# Patient Record
Sex: MALE | Race: BLACK OR AFRICAN AMERICAN | Employment: OTHER | URBAN - METROPOLITAN AREA
[De-identification: names, ages, dates, MRNs, and addresses within clinical notes are randomized per-mention and may not be internally consistent; named-entity substitution may affect disease eponyms.]

---

## 2019-01-29 ENCOUNTER — OFFICE VISIT (OUTPATIENT)
Dept: UROLOGY | Facility: CLINIC | Age: 64
End: 2019-01-29
Payer: COMMERCIAL

## 2019-01-29 VITALS — HEART RATE: 76 BPM | HEIGHT: 68 IN | OXYGEN SATURATION: 95 % | WEIGHT: 170 LBS | BODY MASS INDEX: 25.76 KG/M2

## 2019-01-29 DIAGNOSIS — R31.0 GROSS HEMATURIA: ICD-10-CM

## 2019-01-29 DIAGNOSIS — R31.29 MICROHEMATURIA: Primary | ICD-10-CM

## 2019-01-29 LAB
ALBUMIN UR-MCNC: NEGATIVE MG/DL
APPEARANCE UR: CLEAR
BILIRUB UR QL STRIP: NEGATIVE
COLOR UR AUTO: YELLOW
GLUCOSE UR STRIP-MCNC: NEGATIVE MG/DL
HGB UR QL STRIP: ABNORMAL
KETONES UR STRIP-MCNC: NEGATIVE MG/DL
LEUKOCYTE ESTERASE UR QL STRIP: NEGATIVE
NITRATE UR QL: NEGATIVE
PH UR STRIP: 5 PH (ref 5–7)
SOURCE: ABNORMAL
SP GR UR STRIP: <=1.005 (ref 1–1.03)
UROBILINOGEN UR STRIP-ACNC: 0.2 EU/DL (ref 0.2–1)

## 2019-01-29 PROCEDURE — 99202 OFFICE O/P NEW SF 15 MIN: CPT | Mod: 25 | Performed by: UROLOGY

## 2019-01-29 PROCEDURE — 88120 CYTP URNE 3-5 PROBES EA SPEC: CPT | Performed by: UROLOGY

## 2019-01-29 PROCEDURE — 81003 URINALYSIS AUTO W/O SCOPE: CPT | Performed by: UROLOGY

## 2019-01-29 PROCEDURE — 52000 CYSTOURETHROSCOPY: CPT | Performed by: UROLOGY

## 2019-01-29 RX ORDER — LEVOTHYROXINE SODIUM 100 UG/1
100 TABLET ORAL DAILY
COMMUNITY
Start: 2016-11-02 | End: 2020-11-04 | Stop reason: DRUGHIGH

## 2019-01-29 RX ORDER — CIPROFLOXACIN 500 MG/1
500 TABLET, FILM COATED ORAL ONCE
Qty: 1 TABLET | Refills: 0 | Status: SHIPPED | OUTPATIENT
Start: 2019-01-29 | End: 2019-05-01

## 2019-01-29 RX ORDER — DIPHENOXYLATE HYDROCHLORIDE AND ATROPINE SULFATE 2.5; .025 MG/1; MG/1
1 TABLET ORAL DAILY
COMMUNITY

## 2019-01-29 RX ORDER — LIDOCAINE HYDROCHLORIDE 20 MG/ML
5 INJECTION, SOLUTION INFILTRATION; PERINEURAL ONCE
Status: SHIPPED | OUTPATIENT
Start: 2019-01-29

## 2019-01-29 ASSESSMENT — MIFFLIN-ST. JEOR: SCORE: 1530.61

## 2019-01-29 ASSESSMENT — PAIN SCALES - GENERAL: PAINLEVEL: NO PAIN (0)

## 2019-01-29 NOTE — NURSING NOTE
Prior to the start of the procedure and with procedural staff participation, I verbally confirmed the patient s identity using two indicators, relevant allergies, that the procedure was appropriate and matched the consent or emergent situation, and that the correct equipment/implants were available. Immediately prior to starting the procedure I conducted the Time Out with the procedural staff and re-confirmed the patient s name, procedure, and site/side. (The Joint Commission universal protocol was followed.)  Yes    Sedation (Moderate or Deep): None  Pt has signed the consent form stating that we will be doing a CYSTOSCOPY (with or without stent removal) today, and that it is the correct procedure. I verbally confirmed the patient s identity using two indicators, relevant allergies, and that the correct equipment was available. Post-op information given to the pt as needed at check-out. I have sent an appropriate antibiotic to the pharmacy in our building as recommended by the MD. REGIS Sood CMA

## 2019-01-29 NOTE — LETTER
1/29/2019       RE: Ulysses Orlando  [address]  [place] [address]     Dear Colleague,    Thank you for referring your patient, Ulysses Orlando, to the [facility] [place] at [facility]. Please see a copy of my visit note below.    Ulysses is a pleasant 63-year-old male who had one episode of terminal gross hematuria about 3 weeks ago.  His urinalysis today shows trace of hemoglobin only and will be sent for urine fish test.  He has had no trouble voiding in the past, gross hematuria, stones, UTIs, prostate cancer.  There is no family history of prostate cancer.  He has had normal PSAs in the past but only had 2 rectal exams in the last 6 years.  Other past medical history: Non-smoker, hypothyroidism  Medications: Occasional aspirin, vitamin D, Synthroid, multivitamin  Allergies: None  Exam: Alert and oriented, normal, normal respiration intact.  Normal vital signs.  Normal sphincter tone, no rectal mass or impaction, small benign prostate, normal seminal vesicles.  Normal external genitalia and urethral meatus  Flexible cystoscopy-normal urethra, very mild inflammatory changes in prostatic fossa with no papillary tumors or stones.  Normal bladder neck, normal bladder mucosa  Assessment: Terminal gross hematuria-bleeding usually comes from the prostate in this situation.  He has a normal prostate exam a normal cystoscopic exam.  Will call with fish urine results.  Plan: Antibiotic x1 today.  If he has more terminal gross hematuria in the future he will need a repeat fish test and to start finasteride 5 mg daily.  The patient should have a yearly digital rectal exam and PSAs every other year until age 68    Again, thank you for allowing me to participate in the care of your patient.      Sincerely,    Paresh Salcido MD

## 2019-01-29 NOTE — PROGRESS NOTES
Ulysses is a pleasant 63-year-old male who had one episode of terminal gross hematuria about 3 weeks ago.  His urinalysis today shows trace of hemoglobin only and will be sent for urine fish test.  He has had no trouble voiding in the past, gross hematuria, stones, UTIs, prostate cancer.  There is no family history of prostate cancer.  He has had normal PSAs in the past but only had 2 rectal exams in the last 6 years.  Other past medical history: Non-smoker, hypothyroidism  Medications: Occasional aspirin, vitamin D, Synthroid, multivitamin  Allergies: None  Exam: Alert and oriented, normal, normal respiration intact.  Normal vital signs.  Normal sphincter tone, no rectal mass or impaction, small benign prostate, normal seminal vesicles.  Normal external genitalia and urethral meatus  Flexible cystoscopy-normal urethra, very mild inflammatory changes in prostatic fossa with no papillary tumors or stones.  Normal bladder neck, normal bladder mucosa  Assessment: Terminal gross hematuria-bleeding usually comes from the prostate in this situation.  He has a normal prostate exam a normal cystoscopic exam.  Will call with fish urine results.  Plan: Antibiotic x1 today.  If he has more terminal gross hematuria in the future he will need a repeat fish test and to start finasteride 5 mg daily.  The patient should have a yearly digital rectal exam and PSAs every other year until age 68

## 2019-01-29 NOTE — NURSING NOTE
PSA in epic from 11-28-18.  Pt had drops of blood in his urine at the end of flow.  Notes in care everywhere.  Just that one time is all he saw.  Pt had dysuria when he saw the blood.  PCP= [facility] [place].  REGIS Sood CMA

## 2019-01-29 NOTE — LETTER
1/29/2019      RE: Ulysses Malhotra [facility]  [address]  [place] [address]       Ulysses is a pleasant 63-year-old male who had one episode of terminal gross hematuria about 3 weeks ago.  His urinalysis today shows trace of hemoglobin only and will be sent for urine fish test.  He has had no trouble voiding in the past, gross hematuria, stones, UTIs, prostate cancer.  There is no family history of prostate cancer.  He has had normal PSAs in the past but only had 2 rectal exams in the last 6 years.  Other past medical history: Non-smoker, hypothyroidism  Medications: Occasional aspirin, vitamin D, Synthroid, multivitamin  Allergies: None  Exam: Alert and oriented, normal, normal respiration intact.  Normal vital signs.  Normal sphincter tone, no rectal mass or impaction, small benign prostate, normal seminal vesicles.  Normal external genitalia and urethral meatus  Flexible cystoscopy-normal urethra, very mild inflammatory changes in prostatic fossa with no papillary tumors or stones.  Normal bladder neck, normal bladder mucosa  Assessment: Terminal gross hematuria-bleeding usually comes from the prostate in this situation.  He has a normal prostate exam a normal cystoscopic exam.  Will call with fish urine results.  Plan: Antibiotic x1 today.  If he has more terminal gross hematuria in the future he will need a repeat fish test and to start finasteride 5 mg daily.  The patient should have a yearly digital rectal exam and PSAs every other year until age 68    Paresh Salcido MD

## 2019-01-29 NOTE — PATIENT INSTRUCTIONS
"     AFTER YOUR CYSTOSCOPY         You have just completed a cystoscopy, or \"cysto\", which allowed your physician to learn more about your bladder (or to remove a stent placed after surgery). We suggest that you continue to avoid caffeine, fruit juice, and alcohol for the next 24 hours, however, you are encouraged to return to your normal activities.       A few things that are considered normal after your cystoscopy:    * small amount of bleeding (or spotting) that clears within the next 24 hours    * slight burning sensation with urination    * sensation to of needing to avoid more frequently    * the feeling of \"air\" in your urine    * mild discomfort that is relieved with Tylonol        Please contact our office promptly if you:    * develop a fever above 101 degrees    * are unable to urinate    * develop bright red blood that does not stop    * severe pain or swelling        And of course, please contact our office with any concerns or questions [phone]        "

## 2019-01-29 NOTE — LETTER
Date:January 30, 2019      Patient was self referred, no letter generated. Do not send.        [facility]

## 2019-02-08 LAB — COPATH REPORT: NORMAL

## 2019-05-01 ENCOUNTER — OFFICE VISIT (OUTPATIENT)
Dept: PEDIATRICS | Facility: CLINIC | Age: 64
End: 2019-05-01
Payer: COMMERCIAL

## 2019-05-01 VITALS
WEIGHT: 175.7 LBS | DIASTOLIC BLOOD PRESSURE: 66 MMHG | HEART RATE: 84 BPM | SYSTOLIC BLOOD PRESSURE: 132 MMHG | TEMPERATURE: 98.1 F | BODY MASS INDEX: 26.02 KG/M2 | HEIGHT: 69 IN | OXYGEN SATURATION: 97 %

## 2019-05-01 DIAGNOSIS — R03.0 ELEVATED BLOOD PRESSURE READING WITHOUT DIAGNOSIS OF HYPERTENSION: Primary | ICD-10-CM

## 2019-05-01 PROCEDURE — 99202 OFFICE O/P NEW SF 15 MIN: CPT | Performed by: INTERNAL MEDICINE

## 2019-05-01 ASSESSMENT — MIFFLIN-ST. JEOR: SCORE: 1567.35

## 2019-05-01 NOTE — PATIENT INSTRUCTIONS
For now you do not need medication for your blood pressure    Check periodically   Optimal BP readings: <130 / <85   Too high: >140 / >90   Very high: >180 / >105

## 2019-05-01 NOTE — PROGRESS NOTES
"  SUBJECTIVE:   Said ZAFAR Orlando is a 64 year old male who presents to clinic today for the following   health issues:    Patient here to follow up on Urology appointment and blood pressure      Hx of borderline blood pressure readings.  BP Readings from Last 3 Encounters:   05/01/19 132/66     No cardiac sx such as CP, palpitations, PND, orthopnea, GREWAL or peripheral edema.     He checks his BP periodically.  Notes SBP typically in the 130's.  DBP in the 80's to low 90's.     No hx of medications for BP.     Reviewed and updated as needed this visit by Provider  Tobacco  Allergies  Meds  Problems  Med Hx  Surg Hx  Fam Hx         Labs reviewed in EPIC     ROS: 7 point ROS neg other than the symptoms noted above in the HPI.      OBJECTIVE:     /66 (BP Location: Right arm, Patient Position: Chair, Cuff Size: Adult Regular)   Pulse 84   Temp 98.1  F (36.7  C) (Oral)   Ht 1.753 m (5' 9\")   Wt 79.7 kg (175 lb 11.2 oz)   SpO2 97%   BMI 25.95 kg/m    Body mass index is 25.95 kg/m .     GEN: no distress  HEENT: PERRL. No nasal discharge. OP moist.  SKIN: no rashes  NECK: supple  LUNGS: Clear to auscultation bilaterally. No rhonchi, rales, wheezes or retractions.  CV: Regular rate and rhythm.  No murmurs, rubs or gallops. Pulses 2+ radial.   EXTR: No edema  PSYCH: Normal affect. Well groomed. Good eye contact.     ASSESSMENT/PLAN:       ICD-10-CM    1. Elevated blood pressure reading without diagnosis of hypertension R03.0      No clear dx of HTN.    Patient Instructions   For now you do not need medication for your blood pressure    Check periodically   Optimal BP readings: <130 / <85   Too high: >140 / >90   Very high: >180 / >105    Tomy Cartwright MD  [facility]        "

## 2019-07-01 ENCOUNTER — TELEPHONE (OUTPATIENT)
Dept: UROLOGY | Facility: CLINIC | Age: 64
End: 2019-07-01

## 2019-07-01 NOTE — TELEPHONE ENCOUNTER
Note sent to K to see if he wants to see pt or just start finasteride and do a fish with out seeing pt.  Pt wants to just start finasteride.  REGIS Sood CMA

## 2019-07-01 NOTE — TELEPHONE ENCOUNTER
[facility] Call Center    Phone Message    May a detailed message be left on voicemail: yes    Reason for Call: Symptoms or Concerns     If patient has red-flag symptoms, warm transfer to triage line    Current symptom or concern: blood in urine again.    Symptoms have been present for:  1 day(s)    Has patient previously been seen for this? Yes    By Paresh Salcido MD    Date: 01/29/19    Are there any new or worsening symptoms? Yes: Blood in urine again      Pt would like a call back to let him know if he should be seen by the doctor again or what treatment he might try.  Please call ASAP    Action Taken: Message routed to:  [facility] ([facility] [place]

## 2019-07-02 ENCOUNTER — TELEPHONE (OUTPATIENT)
Dept: UROLOGY | Facility: CLINIC | Age: 64
End: 2019-07-02

## 2019-07-02 NOTE — TELEPHONE ENCOUNTER
I called pt to inform him to start finasteride.  No answer... So I LM for him to call us back.  When he calls back let him know he can start finasteride and which pharmacy he wants the RX to go to.  And... Lets get a fish test.    REGIS Sood CMA

## 2019-07-02 NOTE — TELEPHONE ENCOUNTER
----- Message from Paresh Salcido MD sent at 7/1/2019 10:16 PM CDT -----  OK to start finasteride  ----- Message -----  From: Isabelle Mueller CMA  Sent: 7/1/2019   2:17 PM  To: Paresh Salcido MD    Pt wants to start finasteride.  Pt and you spoke at his last visit.  Can I just sent finasteride in for him and have him do the fish test?  REGIS Sood CMA

## 2019-07-09 DIAGNOSIS — R31.9 HEMATURIA: ICD-10-CM

## 2019-07-09 DIAGNOSIS — N41.9 INFLAMMATORY DISEASE OF PROSTATE: Primary | ICD-10-CM

## 2019-07-09 RX ORDER — FINASTERIDE 5 MG/1
5 TABLET, FILM COATED ORAL DAILY
Qty: 90 TABLET | Refills: 1 | Status: SHIPPED | OUTPATIENT
Start: 2019-07-09 | End: 2020-11-04

## 2019-07-10 DIAGNOSIS — R31.29 MICROSCOPIC HEMATURIA: Primary | ICD-10-CM

## 2019-07-10 DIAGNOSIS — R31.29 MICROSCOPIC HEMATURIA: ICD-10-CM

## 2019-07-10 PROCEDURE — 88120 CYTP URNE 3-5 PROBES EA SPEC: CPT | Performed by: UROLOGY

## 2019-07-19 LAB — COPATH REPORT: NORMAL

## 2019-10-02 ENCOUNTER — HEALTH MAINTENANCE LETTER (OUTPATIENT)
Age: 64
End: 2019-10-02

## 2019-11-06 ENCOUNTER — OFFICE VISIT (OUTPATIENT)
Dept: UROLOGY | Facility: CLINIC | Age: 64
End: 2019-11-06
Payer: COMMERCIAL

## 2019-11-06 VITALS
WEIGHT: 175 LBS | SYSTOLIC BLOOD PRESSURE: 140 MMHG | HEIGHT: 69 IN | DIASTOLIC BLOOD PRESSURE: 78 MMHG | HEART RATE: 80 BPM | BODY MASS INDEX: 25.92 KG/M2

## 2019-11-06 DIAGNOSIS — N02.9 BENIGN HEMATURIA: Primary | ICD-10-CM

## 2019-11-06 PROCEDURE — 99212 OFFICE O/P EST SF 10 MIN: CPT | Performed by: UROLOGY

## 2019-11-06 RX ORDER — FINASTERIDE 5 MG/1
5 TABLET, FILM COATED ORAL DAILY
Qty: 90 TABLET | Refills: 3 | Status: SHIPPED | OUTPATIENT
Start: 2019-11-06 | End: 2020-11-04

## 2019-11-06 SDOH — HEALTH STABILITY: MENTAL HEALTH: HOW OFTEN DO YOU HAVE A DRINK CONTAINING ALCOHOL?: NEVER

## 2019-11-06 ASSESSMENT — MIFFLIN-ST. JEOR: SCORE: 1564.17

## 2019-11-06 ASSESSMENT — PAIN SCALES - GENERAL: PAINLEVEL: NO PAIN (0)

## 2019-11-06 NOTE — PROGRESS NOTES
Ulysses is a 64-year-old male who I saw early in the year because of initial hematuria.  He is found to have a normal cystoscopic exam, normal digital rectal exam and normal urine FISH tests.  He started finasteride later in the year.  He now presents with a concern at the end of the penis-he feels a mass in the glans penis on the right side.  He is having no trouble voiding and no hematuria  Other past medical history: Non-smoker  Medications: Low-dose aspirin, vitamin D, finasteride, Synthroid, multivitamin  Allergies: None  Review of systems: 10 point review of systems negative  Exam: Alert and oriented, normal appearance, normal vital signs.  Normal respirations, neuro grossly intact.  External genitalia normal including glans penis.  There is no mass in the glands on either side.  Normal urethral meatus.  Digital rectal exam not repeated  Assessment: Reassurance.  Refill finasteride and see as needed  He will see Dr. Cartwright at [facility] for yearly physicals and that should include a yearly digital rectal exam and PSA every other year until age 68, then yearly digital rectal exams only unless palpable abnormality

## 2019-11-06 NOTE — LETTER
11/6/2019       RE: Ulysses Orlando  [address]  [place] [address]     Dear Colleague,    Thank you for referring your patient, Ulysses Orlando, to the [facility] UROLOGY CLIN[facility] [place] at [facility]. Please see a copy of my visit note below.    Ulysses is a 64-year-old male who I saw early in the year because of initial hematuria.  He is found to have a normal cystoscopic exam, normal digital rectal exam and normal urine FISH tests.  He started finasteride later in the year.  He now presents with a concern at the end of the penis-he feels a mass in the glans penis on the right side.  He is having no trouble voiding and no hematuria  Other past medical history: Non-smoker  Medications: Low-dose aspirin, vitamin D, finasteride, Synthroid, multivitamin  Allergies: None  Review of systems: 10 point review of systems negative  Exam: Alert and oriented, normal appearance, normal vital signs.  Normal respirations, neuro grossly intact.  External genitalia normal including glans penis.  There is no mass in the glands on either side.  Normal urethral meatus.  Digital rectal exam not repeated  Assessment: Reassurance.  Refill finasteride and see as needed  He will see Dr. Cartwright at [facility] for yearly physicals and that should include a yearly digital rectal exam and PSA every other year until age 70, then yearly digital rectal exams only unless palpable abnormality    Again, thank you for allowing me to participate in the care of your patient.      Sincerely,    Paresh Salcido MD

## 2019-12-15 ENCOUNTER — HEALTH MAINTENANCE LETTER (OUTPATIENT)
Age: 64
End: 2019-12-15

## 2020-07-23 ENCOUNTER — TRANSFERRED RECORDS (OUTPATIENT)
Dept: HEALTH INFORMATION MANAGEMENT | Facility: CLINIC | Age: 65
End: 2020-07-23

## 2020-11-03 ASSESSMENT — ENCOUNTER SYMPTOMS
HEMATURIA: 0
COUGH: 0
PARESTHESIAS: 0
CONSTIPATION: 0
ARTHRALGIAS: 0
DIZZINESS: 0
FREQUENCY: 0
SORE THROAT: 0
FEVER: 0
HEADACHES: 0
MYALGIAS: 0
HEARTBURN: 0
NERVOUS/ANXIOUS: 0
ABDOMINAL PAIN: 0
PALPITATIONS: 0
WEAKNESS: 0
JOINT SWELLING: 0
DIARRHEA: 0
NAUSEA: 0
EYE PAIN: 0
DYSURIA: 0
HEMATOCHEZIA: 0
CHILLS: 0
SHORTNESS OF BREATH: 0

## 2020-11-03 ASSESSMENT — ACTIVITIES OF DAILY LIVING (ADL): CURRENT_FUNCTION: NO ASSISTANCE NEEDED

## 2020-11-04 ENCOUNTER — OFFICE VISIT (OUTPATIENT)
Dept: PEDIATRICS | Facility: CLINIC | Age: 65
End: 2020-11-04
Payer: COMMERCIAL

## 2020-11-04 VITALS
WEIGHT: 181 LBS | TEMPERATURE: 98 F | SYSTOLIC BLOOD PRESSURE: 130 MMHG | OXYGEN SATURATION: 97 % | HEART RATE: 87 BPM | DIASTOLIC BLOOD PRESSURE: 72 MMHG | BODY MASS INDEX: 26.73 KG/M2 | RESPIRATION RATE: 16 BRPM

## 2020-11-04 DIAGNOSIS — E03.9 ACQUIRED HYPOTHYROIDISM: ICD-10-CM

## 2020-11-04 DIAGNOSIS — Z00.00 ENCOUNTER FOR MEDICARE ANNUAL WELLNESS EXAM: Primary | ICD-10-CM

## 2020-11-04 DIAGNOSIS — N02.9 BENIGN HEMATURIA: ICD-10-CM

## 2020-11-04 DIAGNOSIS — Z86.0100 HISTORY OF COLONIC POLYPS: ICD-10-CM

## 2020-11-04 DIAGNOSIS — Z12.5 SCREENING FOR PROSTATE CANCER: ICD-10-CM

## 2020-11-04 PROCEDURE — 80048 BASIC METABOLIC PNL TOTAL CA: CPT | Performed by: INTERNAL MEDICINE

## 2020-11-04 PROCEDURE — 36415 COLL VENOUS BLD VENIPUNCTURE: CPT | Performed by: INTERNAL MEDICINE

## 2020-11-04 PROCEDURE — G0103 PSA SCREENING: HCPCS | Performed by: INTERNAL MEDICINE

## 2020-11-04 PROCEDURE — 99397 PER PM REEVAL EST PAT 65+ YR: CPT | Performed by: INTERNAL MEDICINE

## 2020-11-04 PROCEDURE — 80061 LIPID PANEL: CPT | Performed by: INTERNAL MEDICINE

## 2020-11-04 RX ORDER — FINASTERIDE 5 MG/1
5 TABLET, FILM COATED ORAL DAILY
Qty: 90 TABLET | Refills: 3 | Status: SHIPPED | OUTPATIENT
Start: 2020-11-04

## 2020-11-04 RX ORDER — LEVOTHYROXINE SODIUM 112 UG/1
112 TABLET ORAL DAILY
COMMUNITY
Start: 2020-11-04

## 2020-11-04 ASSESSMENT — ACTIVITIES OF DAILY LIVING (ADL): CURRENT_FUNCTION: NO ASSISTANCE NEEDED

## 2020-11-04 ASSESSMENT — ENCOUNTER SYMPTOMS
DYSURIA: 0
JOINT SWELLING: 0
HEMATOCHEZIA: 0
CONSTIPATION: 0
MYALGIAS: 0
FEVER: 0
NERVOUS/ANXIOUS: 0
PARESTHESIAS: 0
HEMATURIA: 0
FREQUENCY: 0
DIZZINESS: 0
EYE PAIN: 0
COUGH: 0
PALPITATIONS: 0
SHORTNESS OF BREATH: 0
ARTHRALGIAS: 0
NAUSEA: 0
HEADACHES: 0
WEAKNESS: 0
HEARTBURN: 0
SORE THROAT: 0
DIARRHEA: 0
ABDOMINAL PAIN: 0
CHILLS: 0

## 2020-11-04 NOTE — PATIENT INSTRUCTIONS
Patient Education   Personalized Prevention Plan  You are due for the preventive services outlined below.  Your care team is available to assist you in scheduling these services.  If you have already completed any of these items, please share that information with your care team to update in your medical record.  Health Maintenance Due   Topic Date Due     ANNUAL REVIEW OF HM ORDERS  1955     Discuss Advance Care Planning  1955     Colorectal Cancer Screening  01/31/1963     Hepatitis C Screening  01/31/1971     Diptheria Tetanus Pertussis (DTAP/TDAP/TD) Vaccine (1 - Tdap) 01/31/1978     Cholesterol Lab  01/31/1988     Zoster (Shingles) Vaccine (1 of 2) 01/31/2003     Annual Wellness Visit  01/31/2018     FALL RISK ASSESSMENT  01/31/2018     Pneumococcal Vaccine (1 of 1 - PPSV23) 01/31/2018     AORTIC ANEURYSM SCREENING (SYSTEM ASSIGNED)  01/31/2018

## 2020-11-04 NOTE — PROGRESS NOTES
"Answers for HPI/ROS submitted by the patient on 11/3/2020   Annual Exam:  In general, how would you rate your overall physical health?: good  Frequency of exercise:: None  Do you usually eat at least 4 servings of fruit and vegetables a day, include whole grains & fiber, and avoid regularly eating high fat or \"junk\" foods? : Yes  Medication side effects:: None  Activities of Daily Living: no assistance needed  Home safety: no safety concerns identified  Hearing Impairment:: difficulty understanding speech on the telephone  In the past 6 months, have you been bothered by leaking of urine?: No  abdominal pain: No  Blood in stool: No  Blood in urine: No  chest pain: No  chills: No  congestion: No  constipation: No  cough: No  diarrhea: No  dizziness: No  ear pain: No  eye pain: No  nervous/anxious: No  fever: No  frequency: No  genital sores: No  headaches: No  hearing loss: No  heartburn: No  arthralgias: No  joint swelling: No  peripheral edema: No  mood changes: No  myalgias: No  SUBJECTIVE:   Said ZAFAR Orlando is a 65 year old male who presents for Preventive Visit.    Are you in the first 12 months of your Medicare coverage?  No    Healthy Habits:     In general, how would you rate your overall health?  Good    Frequency of exercise:  None    Do you usually eat at least 4 servings of fruit and vegetables a day, include whole grains    & fiber and avoid regularly eating high fat or \"junk\" foods?  Yes    Medication side effects:  None    Ability to successfully perform activities of daily living:  No assistance needed    Home Safety:  No safety concerns identified    Hearing Impairment:  Difficulty understanding speech on the telephone    In the past 6 months, have you been bothered by leaking of urine?  No    In general, how would you rate your overall mental or emotional health?  Excellent      PHQ-2 Total Score: 0    Do you feel safe in your environment? Yes    Have you ever done Advance Care Planning? (For " example, a Health Directive, POLST, or a discussion with a medical provider or your loved ones about your wishes): No, advance care planning information given to patient to review.  Patient plans to discuss their wishes with loved ones or provider.      Fall risk  Fallen 2 or more times in the past year?: No  Any fall with injury in the past year?: No    Cognitive Screening   1) Repeat 3 items (Leader, Season, Table)    2) Clock draw: NORMAL  3) 3 item recall: Recalls 2 objects   Results: NORMAL clock, 1-2 items recalled: COGNITIVE IMPAIRMENT LESS LIKELY    Mini-CogTM Copyright S Meghann. Licensed by the author for use in [facility]; reprinted with permission (blanquita@[facility]). All rights reserved.      BPH. Helped by finasteride.    Hypothyroid. Managed by endocrinology. Current dose 112 mcg.    Do you have sleep apnea, excessive snoring or daytime drowsiness?: no    Reviewed and updated as needed this visit by Provider  Tobacco  Allergies  Meds  Problems  Med Hx  Surg Hx  Fam Hx           Social History     Tobacco Use     Smoking status: Never Smoker     Smokeless tobacco: Never Used   Substance Use Topics     Alcohol use: Never     Frequency: Never     If you drink alcohol do you typically have >3 drinks per day or >7 drinks per week? No    Alcohol Use 11/3/2020   Prescreen: >3 drinks/day or >7 drinks/week? No         Current providers sharing in care for this patient include:   Patient Care Team:  Tomy Cartwright MD as PCP - General (Internal Medicine)  Tomy Cartwright MD as Assigned PCP  Paresh Salcido MD as Assigned Surgical Provider    The following health maintenance items are reviewed in Epic and correct as of today:  Health Maintenance   Topic Date Due     ANNUAL REVIEW OF HM ORDERS  1955     COLORECTAL CANCER SCREENING  01/31/1963     HEPATITIS C SCREENING  01/31/1971     DTAP/TDAP/TD IMMUNIZATION (1 - Tdap) 01/31/1978     LIPID  01/31/1988     ZOSTER IMMUNIZATION (1 of 2)  "01/31/2003     FALL RISK ASSESSMENT  01/31/2018     Pneumococcal Vaccine: 65+ Years (1 of 1 - PPSV23) 01/31/2018     AORTIC ANEURYSM SCREENING (SYSTEM ASSIGNED)  01/31/2018     MEDICARE ANNUAL WELLNESS VISIT  11/04/2021     ADVANCE CARE PLANNING  11/04/2025     PHQ-2  Completed     INFLUENZA VACCINE  Completed     Pneumococcal Vaccine: Pediatrics (0 to 5 Years) and At-Risk Patients (6 to 64 Years)  Aged Out     IPV IMMUNIZATION  Aged Out     MENINGITIS IMMUNIZATION  Aged Out     HEPATITIS B IMMUNIZATION  Aged Out       Review of Systems   Constitutional: Negative for chills and fever.   HENT: Negative for congestion, ear pain, hearing loss and sore throat.    Eyes: Negative for pain and visual disturbance.   Respiratory: Negative for cough and shortness of breath.    Cardiovascular: Negative for chest pain, palpitations and peripheral edema.   Gastrointestinal: Negative for abdominal pain, constipation, diarrhea, heartburn, hematochezia and nausea.   Genitourinary: Negative for discharge, dysuria, frequency, genital sores, hematuria, impotence and urgency.   Musculoskeletal: Negative for arthralgias, joint swelling and myalgias.   Skin: Negative for rash.   Neurological: Negative for dizziness, weakness, headaches and paresthesias.   Psychiatric/Behavioral: Negative for mood changes. The patient is not nervous/anxious.        OBJECTIVE:   /72   Pulse 87   Temp 98  F (36.7  C) (Tympanic)   Resp 16   Wt 82.1 kg (181 lb)   SpO2 97%   BMI 26.73 kg/m   Estimated body mass index is 26.73 kg/m  as calculated from the following:    Height as of 11/6/19: 1.753 m (5' 9\").    Weight as of this encounter: 82.1 kg (181 lb).  Physical Exam  GENERAL: healthy, alert and no distress  EYES: Eyes grossly normal to inspection, PERRL and conjunctivae and sclerae normal  HENT: ear canals and TM's normal  NECK: no adenopathy, no asymmetry, masses, or scars and thyroid normal to palpation  RESP: lungs clear to auscultation - no " "rales, rhonchi or wheezes  CV: regular rate and rhythm, normal S1 S2, no S3 or S4, no murmur, click or rub, no peripheral edema and peripheral pulses strong  ABDOMEN: soft, nontender, no hepatosplenomegaly, no masses and bowel sounds normal  MS: no gross musculoskeletal defects noted, no edema  SKIN: no suspicious lesions or rashes  NEURO: Normal strength and tone, mentation intact and speech normal  PSYCH: mentation appears normal, affect normal/bright      ASSESSMENT / PLAN:       ICD-10-CM    1. Encounter for Medicare annual wellness exam  Z00.00 Lipid panel reflex to direct LDL Fasting     Basic metabolic panel   2. Benign hematuria  N02.9 finasteride (PROSCAR) 5 MG tablet   3. Acquired hypothyroidism  E03.9 levothyroxine (SYNTHROID/LEVOTHROID) 112 MCG tablet   4. Screening for prostate cancer  Z12.5 Prostate spec antigen screen   5. History of colonic polyps  Z86.010 GASTROENTEROLOGY ADULT REF PROCEDURE ONLY       COUNSELING:  Reviewed preventive health counseling, as reflected in patient instructions    Estimated body mass index is 26.73 kg/m  as calculated from the following:    Height as of 11/6/19: 1.753 m (5' 9\").    Weight as of this encounter: 82.1 kg (181 lb).        He reports that he has never smoked. He has never used smokeless tobacco.      Appropriate preventive services were discussed with this patient, including applicable screening as appropriate for cardiovascular disease, diabetes, osteopenia/osteoporosis, and glaucoma.  As appropriate for age/gender, discussed screening for colorectal cancer, prostate cancer. Checklist reviewing preventive services available has been given to the patient.    Reviewed patients plan of care and provided an AVS. The Basic Care Plan (routine screening as documented in Health Maintenance) for Said meets the Care Plan requirement. This Care Plan has been established and reviewed with the Patient.    Counseling Resources:  ATP IV Guidelines  Pooled Cohorts Equation " Calculator  Breast Cancer Risk Calculator  Breast Cancer: Medication to Reduce Risk  FRAX Risk Assessment  ICSI Preventive Guidelines  Dietary Guidelines for Americans, 2010  [employer]'s MyPlate  ASA Prophylaxis  Lung CA Screening    Tomy Cartwright MD  [facility]    Identified Health Risks:  nausea: No  dysuria: No  palpitations: No  Skin sensation changes: No  sore throat: No  urgency: No  rash: No  shortness of breath: No  visual disturbance: No  weakness: No  impotence: No  penile discharge: No  In general, how would you rate your overall mental or emotional health?: excellent

## 2020-11-05 LAB
ANION GAP SERPL CALCULATED.3IONS-SCNC: 6 MMOL/L (ref 3–14)
BUN SERPL-MCNC: 15 MG/DL (ref 7–30)
CALCIUM SERPL-MCNC: 9.2 MG/DL (ref 8.5–10.1)
CHLORIDE SERPL-SCNC: 107 MMOL/L (ref 94–109)
CHOLEST SERPL-MCNC: 238 MG/DL
CO2 SERPL-SCNC: 25 MMOL/L (ref 20–32)
CREAT SERPL-MCNC: 1.09 MG/DL (ref 0.66–1.25)
GFR SERPL CREATININE-BSD FRML MDRD: 69 ML/MIN/{1.73_M2}
GLUCOSE SERPL-MCNC: 82 MG/DL (ref 70–99)
HDLC SERPL-MCNC: 53 MG/DL
LDLC SERPL CALC-MCNC: 162 MG/DL
NONHDLC SERPL-MCNC: 185 MG/DL
POTASSIUM SERPL-SCNC: 4.2 MMOL/L (ref 3.4–5.3)
PSA SERPL-ACNC: 0.63 UG/L (ref 0–4)
SODIUM SERPL-SCNC: 138 MMOL/L (ref 133–144)
TRIGL SERPL-MCNC: 113 MG/DL

## 2021-03-07 ENCOUNTER — IMMUNIZATION (OUTPATIENT)
Dept: NURSING | Facility: CLINIC | Age: 66
End: 2021-03-07
Payer: COMMERCIAL

## 2021-03-07 PROCEDURE — 0031A PR COVID VAC JANSSEN AD26 0.5ML: CPT

## 2021-03-07 PROCEDURE — 91303 PR COVID VAC JANSSEN AD26 0.5ML: CPT

## 2021-04-23 ENCOUNTER — OFFICE VISIT (OUTPATIENT)
Dept: PEDIATRICS | Facility: CLINIC | Age: 66
End: 2021-04-23
Payer: COMMERCIAL

## 2021-04-23 VITALS
WEIGHT: 177 LBS | DIASTOLIC BLOOD PRESSURE: 76 MMHG | SYSTOLIC BLOOD PRESSURE: 130 MMHG | OXYGEN SATURATION: 99 % | BODY MASS INDEX: 26.14 KG/M2 | TEMPERATURE: 97.3 F | HEART RATE: 71 BPM

## 2021-04-23 DIAGNOSIS — H61.22 IMPACTED CERUMEN OF LEFT EAR: Primary | ICD-10-CM

## 2021-04-23 PROCEDURE — 99213 OFFICE O/P EST LOW 20 MIN: CPT | Performed by: INTERNAL MEDICINE

## 2021-04-23 NOTE — PROGRESS NOTES
"  Assessment & Plan       ICD-10-CM    1. Impacted cerumen of left ear  H61.22      Easily removed w/ flush in clinic today.     No follow-ups on file.    Tomy Cartwright MD  [facility] SHANE Arora is a 66 year old who presents for the following health issues     HPI     Hearing loss - left ear X 2 weeks   Sudden onset.  Notes a \"hissing\" noise much of the time  No change in balance. No vertigo or LH sx.   Does not use Q-tips. Did try a wax removal tool yesterday.       Objective    /76 (BP Location: Right arm, Cuff Size: Adult Regular)   Pulse 71   Temp 97.3  F (36.3  C) (Tympanic)   Wt 80.3 kg (177 lb)   SpO2 99%   BMI 26.14 kg/m    Body mass index is 26.14 kg/m .  Physical Exam   GEN: no distress  EARS: Right canal clear. TM clear. Left canal w/ scabbed area at 6:00 position. TM occluded by cerumen.  Water flush easily cleared cerumen. Following, TM is clear.               "

## 2021-09-04 ENCOUNTER — HEALTH MAINTENANCE LETTER (OUTPATIENT)
Age: 66
End: 2021-09-04

## 2021-10-12 ENCOUNTER — OFFICE VISIT (OUTPATIENT)
Dept: UROLOGY | Facility: CLINIC | Age: 66
End: 2021-10-12
Payer: COMMERCIAL

## 2021-10-12 VITALS
HEART RATE: 91 BPM | HEIGHT: 68 IN | BODY MASS INDEX: 26.52 KG/M2 | DIASTOLIC BLOOD PRESSURE: 68 MMHG | WEIGHT: 175 LBS | SYSTOLIC BLOOD PRESSURE: 117 MMHG | OXYGEN SATURATION: 98 %

## 2021-10-12 DIAGNOSIS — R31.0 GROSS HEMATURIA: Primary | ICD-10-CM

## 2021-10-12 LAB
ALBUMIN UR-MCNC: NEGATIVE MG/DL
APPEARANCE UR: CLEAR
BILIRUB UR QL STRIP: NEGATIVE
COLOR UR AUTO: YELLOW
GLUCOSE UR STRIP-MCNC: NEGATIVE MG/DL
HGB UR QL STRIP: ABNORMAL
KETONES UR STRIP-MCNC: NEGATIVE MG/DL
LEUKOCYTE ESTERASE UR QL STRIP: ABNORMAL
NITRATE UR QL: NEGATIVE
PH UR STRIP: 5.5 [PH] (ref 5–7)
SP GR UR STRIP: 1.01 (ref 1–1.03)
UROBILINOGEN UR STRIP-ACNC: 0.2 E.U./DL

## 2021-10-12 PROCEDURE — 81003 URINALYSIS AUTO W/O SCOPE: CPT | Performed by: UROLOGY

## 2021-10-12 PROCEDURE — 88112 CYTOPATH CELL ENHANCE TECH: CPT | Performed by: PATHOLOGY

## 2021-10-12 PROCEDURE — 99214 OFFICE O/P EST MOD 30 MIN: CPT | Performed by: UROLOGY

## 2021-10-12 ASSESSMENT — MIFFLIN-ST. JEOR: SCORE: 1538.29

## 2021-10-12 ASSESSMENT — PAIN SCALES - GENERAL: PAINLEVEL: NO PAIN (0)

## 2021-10-12 NOTE — PROGRESS NOTES
Office Visit Note  [facility]  [phone]    UROLOGIC DIAGNOSES:   Hematuria    CURRENT INTERVENTIONS:       HISTORY:   This is a 66-year-old gentleman who was seen by Dr. Salcido in early 2019 for terminal gross hematuria.  He had a cystoscopy at that time that was negative.  A urinary FISH was negative at that time as well.  It does not appear that upper urinary tract imaging was performed.    He returns to clinic today because he says he recently had a recurrence of his gross hematuria. In a similar manner he had terminal gross hematuria for 1 day. This resolved. He says he perhaps had a small amount of burning with urination but overall no major complaints. He reports a normal urinary stream. No frequency or urgency. No nocturia.      PAST MEDICAL HISTORY:   Past Medical History:   Diagnosis Date     Hypothyroid        PAST SURGICAL HISTORY: History reviewed. No pertinent surgical history.    FAMILY HISTORY:   Family History   Problem Relation Age of Onset     Heart Disease Father      Heart Disease Paternal Grandfather      Heart Disease Brother        SOCIAL HISTORY:   Social History     Socioeconomic History     Marital status:      Spouse name: None     Number of children: None     Years of education: None     Highest education level: None   Occupational History     None   Tobacco Use     Smoking status: Never Smoker     Smokeless tobacco: Never Used   Substance and Sexual Activity     Alcohol use: Never     Drug use: Never     Sexual activity: Yes   Other Topics Concern     Parent/sibling w/ CABG, MI or angioplasty before 65F 55M? Not Asked   Social History Narrative     None     Social Determinants of Health     Financial Resource Strain:      Difficulty of Paying Living Expenses:    Food Insecurity:      Worried About Running Out of Food in the Last Year:      Ran Out of Food in the Last Year:    Transportation Needs:      Lack of Transportation (Medical):      Lack of  "Transportation (Non-Medical):    Physical Activity:      Days of Exercise per Week:      Minutes of Exercise per Session:    Stress:      Feeling of Stress :    Social Connections:      Frequency of Communication with Friends and Family:      Frequency of Social Gatherings with Friends and Family:      Attends [employer] Services:      Active Member of Clubs or Organizations:      Attends Club or Organization Meetings:      Marital Status:    Intimate Partner Violence:      Fear of Current or Ex-Partner:      Emotionally Abused:      Physically Abused:      Sexually Abused:        Review Of Systems:  Skin: No rash, pruritis, or skin pigmentation  Eyes: No changes in vision  Ears/Nose/Throat: No changes in hearing, no nosebleeds  Respiratory: No shortness of breath, dyspnea on exertion, cough, or hemoptysis  Cardiovascular: No chest pain or palpitations  Gastrointestinal: No diarrhea or constipation. No abdominal pain. No hematochezia  Genitourinary: see HPI  Musculoskeletal: No pain or swelling of joints, normal range of motion  Neurologic: No weakness or tremors  Psychiatric: No recent changes in memory or mood  Hematologic/Lymphatic/Immunologic: No easy bruising or enlarged lymph nodes  Endocrine: No weight gain or loss      PHYSICAL EXAM:    /68   Pulse 91   Ht 1.727 m (5' 8\")   Wt 79.4 kg (175 lb)   SpO2 98%   BMI 26.61 kg/m      Constitutional: Well developed. Conversant and in no acute distress  Eyes: Anicteric sclera, conjunctiva clear, normal extraocular movements  ENT: Normocephalic and atraumatic,   Skin: Warm and dry. No rashes or lesions  Cardiac: No peripheral edema  Back/Flank: Not done  CNS/PNS: Normal musculature and movements, moves all extremities normally  Respiratory: Normal non-labored breathing  Abdomen: Soft nontender and nondistended  Peripheral Vascular: No peripheral edema  Mental Status/Psych: Alert and Oriented x 3. Normal mood and affect    Penis: Normal  Scrotal skin: Normal, " no lesions  Testicles: Normal to palpation bilaterally  Epididymis: Normal to palpation bilaterally  Lymphatic: Normal inguinal lymph nodes    Digital Rectal Exam: The prostate is not enlarged, it is benign and symmetric to palpation    Cystoscopy: Not done    Imaging: None    Urinalysis: UA RESULTS:  Recent Labs   Lab Test 10/12/21  0928   COLOR Yellow   APPEARANCE Clear   URINEGLC Negative   URINEBILI Negative   URINEKETONE Negative   SG 1.010   UBLD Trace*   URINEPH 5.5   PROTEIN Negative   UROBILINOGEN 0.2   NITRITE Negative   LEUKEST Trace*       PSA: 0.63    Post Void Residual:     Other labs: None today      IMPRESSION:  Gross hematuria    PLAN:  He had a recurrence of his gross hematuria. Cytology and cystoscopy were -2 years ago. I counseled him that I do not think we need to repeat the cystoscopy yet. However, I do think we should resend urine from today for cytology and he also needs upper tract imaging. I recommended a CT urogram. We discussed CT scan and he agreed to the test. I will contact him when the results of the CT scan are available.      Lexa Junior M.D.

## 2021-10-12 NOTE — LETTER
10/12/2021       RE: Ulysses Orlando  [address]  [place] [address]     Dear Colleague,    Thank you for referring your patient, Said ZAFAR Orlando, to the [facility]. Please see a copy of my visit note below.    Office Visit Note  [facility]  [phone]    UROLOGIC DIAGNOSES:   Hematuria    CURRENT INTERVENTIONS:       HISTORY:   This is a 66-year-old gentleman who was seen by Dr. Salcido in early 2019 for terminal gross hematuria.  He had a cystoscopy at that time that was negative.  A urinary FISH was negative at that time as well.  It does not appear that upper urinary tract imaging was performed.    He returns to clinic today because he says he recently had a recurrence of his gross hematuria. In a similar manner he had terminal gross hematuria for 1 day. This resolved. He says he perhaps had a small amount of burning with urination but overall no major complaints. He reports a normal urinary stream. No frequency or urgency. No nocturia.      PAST MEDICAL HISTORY:   Past Medical History:   Diagnosis Date     Hypothyroid        PAST SURGICAL HISTORY: History reviewed. No pertinent surgical history.    FAMILY HISTORY:   Family History   Problem Relation Age of Onset     Heart Disease Father      Heart Disease Paternal Grandfather      Heart Disease Brother        SOCIAL HISTORY:   Social History     Socioeconomic History     Marital status:      Spouse name: None     Number of children: None     Years of education: None     Highest education level: None   Occupational History     None   Tobacco Use     Smoking status: Never Smoker     Smokeless tobacco: Never Used   Substance and Sexual Activity     Alcohol use: Never     Drug use: Never     Sexual activity: Yes   Other Topics Concern     Parent/sibling w/ CABG, MI or angioplasty before 65F 55M? Not Asked   Social History Narrative     None  "    Social Determinants of Health     Financial Resource Strain:      Difficulty of Paying Living Expenses:    Food Insecurity:      Worried About Running Out of Food in the Last Year:      Ran Out of Food in the Last Year:    Transportation Needs:      Lack of Transportation (Medical):      Lack of Transportation (Non-Medical):    Physical Activity:      Days of Exercise per Week:      Minutes of Exercise per Session:    Stress:      Feeling of Stress :    Social Connections:      Frequency of Communication with Friends and Family:      Frequency of Social Gatherings with Friends and Family:      Attends Congregational Services:      Active Member of Clubs or Organizations:      Attends Club or Organization Meetings:      Marital Status:    Intimate Partner Violence:      Fear of Current or Ex-Partner:      Emotionally Abused:      Physically Abused:      Sexually Abused:        Review Of Systems:  Skin: No rash, pruritis, or skin pigmentation  Eyes: No changes in vision  Ears/Nose/Throat: No changes in hearing, no nosebleeds  Respiratory: No shortness of breath, dyspnea on exertion, cough, or hemoptysis  Cardiovascular: No chest pain or palpitations  Gastrointestinal: No diarrhea or constipation. No abdominal pain. No hematochezia  Genitourinary: see HPI  Musculoskeletal: No pain or swelling of joints, normal range of motion  Neurologic: No weakness or tremors  Psychiatric: No recent changes in memory or mood  Hematologic/Lymphatic/Immunologic: No easy bruising or enlarged lymph nodes  Endocrine: No weight gain or loss      PHYSICAL EXAM:    /68   Pulse 91   Ht 1.727 m (5' 8\")   Wt 79.4 kg (175 lb)   SpO2 98%   BMI 26.61 kg/m      Constitutional: Well developed. Conversant and in no acute distress  Eyes: Anicteric sclera, conjunctiva clear, normal extraocular movements  ENT: Normocephalic and atraumatic,   Skin: Warm and dry. No rashes or lesions  Cardiac: No peripheral edema  Back/Flank: Not done  CNS/PNS: " Normal musculature and movements, moves all extremities normally  Respiratory: Normal non-labored breathing  Abdomen: Soft nontender and nondistended  Peripheral Vascular: No peripheral edema  Mental Status/Psych: Alert and Oriented x 3. Normal mood and affect    Penis: Normal  Scrotal skin: Normal, no lesions  Testicles: Normal to palpation bilaterally  Epididymis: Normal to palpation bilaterally  Lymphatic: Normal inguinal lymph nodes    Digital Rectal Exam: The prostate is not enlarged, it is benign and symmetric to palpation    Cystoscopy: Not done    Imaging: None    Urinalysis: UA RESULTS:  Recent Labs   Lab Test 10/12/21  0928   COLOR Yellow   APPEARANCE Clear   URINEGLC Negative   URINEBILI Negative   URINEKETONE Negative   SG 1.010   UBLD Trace*   URINEPH 5.5   PROTEIN Negative   UROBILINOGEN 0.2   NITRITE Negative   LEUKEST Trace*       PSA: 0.63    Post Void Residual:     Other labs: None today      IMPRESSION:  Gross hematuria    PLAN:  He had a recurrence of his gross hematuria. Cytology and cystoscopy were -2 years ago. I counseled him that I do not think we need to repeat the cystoscopy yet. However, I do think we should resend urine from today for cytology and he also needs upper tract imaging. I recommended a CT urogram. We discussed CT scan and he agreed to the test. I will contact him when the results of the CT scan are available.      Lexa Junior M.D.

## 2021-10-12 NOTE — NURSING NOTE
Chief Complaint   Patient presents with     Gross Hematuria     Patient here to discuss blood in urine     Pt states he noticed a couple drops of blood after urinating, this only happened once over the past week.      Roseanna Caldwell, CMA

## 2021-10-14 LAB
PATH REPORT.COMMENTS IMP SPEC: NORMAL
PATH REPORT.FINAL DX SPEC: NORMAL
PATH REPORT.GROSS SPEC: NORMAL
PATH REPORT.RELEVANT HX SPEC: NORMAL

## 2021-11-09 ENCOUNTER — TRANSFERRED RECORDS (OUTPATIENT)
Dept: HEALTH INFORMATION MANAGEMENT | Facility: CLINIC | Age: 66
End: 2021-11-09
Payer: COMMERCIAL

## 2021-12-25 ENCOUNTER — HEALTH MAINTENANCE LETTER (OUTPATIENT)
Age: 66
End: 2021-12-25

## 2021-12-30 ENCOUNTER — HOSPITAL ENCOUNTER (OUTPATIENT)
Dept: CT IMAGING | Facility: CLINIC | Age: 66
Discharge: HOME | End: 2021-12-30
Attending: UROLOGY | Admitting: UROLOGY
Payer: COMMERCIAL

## 2021-12-30 DIAGNOSIS — R31.0 GROSS HEMATURIA: ICD-10-CM

## 2021-12-30 PROCEDURE — 74178 CT ABD&PLV WO CNTR FLWD CNTR: CPT

## 2021-12-30 PROCEDURE — 250N000011 HC RX IP 250 OP 636: Performed by: UROLOGY

## 2021-12-30 PROCEDURE — 250N000009 HC RX 250: Performed by: UROLOGY

## 2021-12-30 RX ORDER — IOPAMIDOL 755 MG/ML
500 INJECTION, SOLUTION INTRAVASCULAR ONCE
Status: COMPLETED | OUTPATIENT
Start: 2021-12-30 | End: 2021-12-30

## 2021-12-30 RX ADMIN — SODIUM CHLORIDE 95 ML: 9 INJECTION, SOLUTION INTRAVENOUS at 15:23

## 2021-12-30 RX ADMIN — IOPAMIDOL 88 ML: 755 INJECTION, SOLUTION INTRAVENOUS at 15:22

## 2022-02-09 ENCOUNTER — TRANSFERRED RECORDS (OUTPATIENT)
Dept: HEALTH INFORMATION MANAGEMENT | Facility: CLINIC | Age: 67
End: 2022-02-09
Payer: COMMERCIAL

## 2022-10-22 ENCOUNTER — HEALTH MAINTENANCE LETTER (OUTPATIENT)
Age: 67
End: 2022-10-22

## 2023-05-17 ENCOUNTER — PATIENT OUTREACH (OUTPATIENT)
Dept: CARE COORDINATION | Facility: CLINIC | Age: 68
End: 2023-05-17
Payer: COMMERCIAL

## 2023-06-01 ENCOUNTER — PATIENT OUTREACH (OUTPATIENT)
Dept: CARE COORDINATION | Facility: CLINIC | Age: 68
End: 2023-06-01
Payer: COMMERCIAL

## 2024-06-06 ENCOUNTER — PATIENT OUTREACH (OUTPATIENT)
Dept: CARE COORDINATION | Facility: CLINIC | Age: 69
End: 2024-06-06
Payer: COMMERCIAL

## 2024-06-20 ENCOUNTER — PATIENT OUTREACH (OUTPATIENT)
Dept: CARE COORDINATION | Facility: CLINIC | Age: 69
End: 2024-06-20
Payer: COMMERCIAL

## 2024-08-11 ENCOUNTER — HEALTH MAINTENANCE LETTER (OUTPATIENT)
Age: 69
End: 2024-08-11

## 2025-05-07 ENCOUNTER — TRANSFERRED RECORDS (OUTPATIENT)
Dept: HEALTH INFORMATION MANAGEMENT | Facility: CLINIC | Age: 70
End: 2025-05-07
Payer: COMMERCIAL